# Patient Record
Sex: FEMALE | Race: WHITE | URBAN - METROPOLITAN AREA
[De-identification: names, ages, dates, MRNs, and addresses within clinical notes are randomized per-mention and may not be internally consistent; named-entity substitution may affect disease eponyms.]

---

## 2021-07-27 ENCOUNTER — OFFICE (OUTPATIENT)
Dept: URBAN - METROPOLITAN AREA CLINIC 67 | Facility: CLINIC | Age: 61
End: 2021-07-27

## 2021-07-27 VITALS
TEMPERATURE: 97.2 F | DIASTOLIC BLOOD PRESSURE: 70 MMHG | HEIGHT: 65 IN | SYSTOLIC BLOOD PRESSURE: 110 MMHG | WEIGHT: 111 LBS

## 2021-07-27 DIAGNOSIS — K62.5 RECTAL BLEEDING: ICD-10-CM

## 2021-07-27 DIAGNOSIS — Z86.010: ICD-10-CM

## 2021-07-27 DIAGNOSIS — K64.9 HEMORRHOIDS: ICD-10-CM

## 2021-07-27 DIAGNOSIS — Z80.0 FAMILY HISTORY OF MALIGNANT NEOPLASM OF COLON: ICD-10-CM

## 2021-07-27 PROCEDURE — 99242 OFF/OP CONSLTJ NEW/EST SF 20: CPT | Performed by: NURSE PRACTITIONER

## 2021-07-27 NOTE — SERVICEHPINOTES
This is a   60   year old  female   seen   in consultation at the request of Dr. GORDON PERAZA  . Pt w/ hx of adenomatous colon polyps (according to pt post-colonoscopy report from 2009, as well according to pt's recollection after her last colonoscopy in 2017 when she was told to repeat colonoscopy in 3-5 years) as well as w/ FHX of multiple cancers including 2 maternal aunts w/ CRC referred for evaluation prior to a repeat colonoscopy. BRPatient denies fever, nausea, vomiting, dysphagia, reflux, abdominal pain, change in bowel habits, constipation, diarrhea, melena, and significant change in weight. Denies shortness of breath and chest pain.   Currently asymptomatic but occasional rectal bleeding on TP poss from hemorrhoids as she feels "uncomfortable after long runs" and apparently was told she had hemorrhoids at the last colonoscopy.

## 2021-08-13 ENCOUNTER — AMBULATORY SURGICAL CENTER (OUTPATIENT)
Dept: URBAN - METROPOLITAN AREA SURGERY 42 | Facility: SURGERY | Age: 61
End: 2021-08-13

## 2021-08-13 VITALS
HEIGHT: 65 IN | OXYGEN SATURATION: 99 % | HEART RATE: 64 BPM | RESPIRATION RATE: 20 BRPM | WEIGHT: 111 LBS | TEMPERATURE: 97.9 F | DIASTOLIC BLOOD PRESSURE: 60 MMHG | SYSTOLIC BLOOD PRESSURE: 128 MMHG

## 2021-08-13 DIAGNOSIS — Z86.010 PERSONAL HISTORY OF COLONIC POLYPS: ICD-10-CM

## 2021-08-13 PROCEDURE — 45378 DIAGNOSTIC COLONOSCOPY: CPT | Performed by: INTERNAL MEDICINE

## 2021-08-13 NOTE — SERVICEHPINOTES
This is a 60 year old female seen in consultation at the request of Dr. GORDON PERAZA. Pt w/ hx of adenomatous colon polyps (according to pt post-colonoscopy report from 2009, as well according to pt's recollection after her last colonoscopy in 2017 when she was told to repeat colonoscopy in 3-5 years) as well as w/ FHX of multiple cancers including 2 maternal aunts w/ CRC referred for evaluation prior to a repeat colonoscopy. BRPatient denies fever, nausea, vomiting, dysphagia, reflux, abdominal pain, change in bowel habits, constipation, diarrhea, melena, and significant change in weight. Denies shortness of breath and chest pain. Currently asymptomatic but occasional rectal bleeding on TP poss from hemorrhoids as she feels "uncomfortable after long runs" and apparently was told she had hemorrhoids at the last colonoscopy.

## 2021-10-21 ENCOUNTER — NURSE ONLY (OUTPATIENT)
Dept: HEMATOLOGY/ONCOLOGY | Facility: HOSPITAL | Age: 61
End: 2021-10-21
Payer: COMMERCIAL

## 2021-10-21 ENCOUNTER — GENETICS ENCOUNTER (OUTPATIENT)
Dept: GENETICS | Facility: HOSPITAL | Age: 61
End: 2021-10-21
Payer: COMMERCIAL

## 2021-10-21 DIAGNOSIS — Z80.9 FAMILY HISTORY OF CANCER: Primary | ICD-10-CM

## 2021-10-21 DIAGNOSIS — Z84.81 FAMILY HISTORY OF BRCA2 GENE POSITIVE: ICD-10-CM

## 2021-10-21 PROCEDURE — 36415 COLL VENOUS BLD VENIPUNCTURE: CPT

## 2021-10-21 PROCEDURE — 96040 HC GENETIC COUNSELING EA 30 MIN: CPT

## 2021-10-21 NOTE — PROGRESS NOTES
Patient Name: Gustavo Whittaker  YOB: 1960  Date of Visit: 10/21/2021    Reason for visit:  Ms. Rubina Andersen was seen for the purposes of genetic counseling due to a family history of cancer and a known familial BRCA2 c.5645C>A ([id number]*) pathogeni ~1 year ago (no report). She has 4 daughters, 2 are carriers of cystic fibrosis.   - 1 sister (~52 with negative genetic testing) and 3 brothers (72 had negative genetic testing, 62 no genetic testing, 48 with positive genetic testing for the BRCA2 c.5645C> Ms. Nirmal Conde has 5 maternal aunts and 6 maternal uncles:  - 1st maternal aunt  ~1 y due to a congenital heart defect.   - 2nd maternal aunt  in her 80s with uterine cancer dx at 80, she had 7 children, 1 son with thyroid cancer and 1 daughter with t same BRCA2 variant as his father, genetic testing is indicated.      Information and Medical Management Recommendations for Individuals with a BRCA2 Pathogenic Variant  Individuals with a single pathogenic BRCA2 mutation have an increased risk to develop ce ovarian cancer in the family if applicable.   • Although of uncertain benefit, for those patients who have not elected risk-reducing salpingo-oophorectomy (surgical removal of the fallopian tubes and ovaries), consider concurrent transvaginal ultrasound wit physician. Additionally, individuals with a single pathogenic BRCA2 variant are also carriers of autosomal recessive Fanconi anemia.  Fanconi anemia is characterized by bone marrow failure with variable additional anomalies, which often include short s Individuals who are positive for the same mutation would be expected to have a much greater risk for developing hereditary cancer during their lifetime than the general population and surveillance and management would be rigorous.   For those individuals wh information could potentially be used to inform decisions concerning eligibility, premiums, and coverage. Ms. Michaela Riddle appeared to understand the information presented. On the day of the visit Ms. Michaela Riddle elected to proceed with genetic testing for the fa

## 2021-11-02 ENCOUNTER — GENETICS ENCOUNTER (OUTPATIENT)
Dept: HEMATOLOGY/ONCOLOGY | Facility: HOSPITAL | Age: 61
End: 2021-11-02

## 2021-11-02 DIAGNOSIS — Z13.71 BRCA2 GENE MUTATION NEGATIVE IN FEMALE: Primary | ICD-10-CM

## 2021-11-02 NOTE — PROGRESS NOTES
Patient Name: Brenna Uriostegui  YOB: 1960    Referring Provider:  Self/family     Reason for Referral:  Ms. Livan Zarco had genetic testing performed on 10/21/2021 because of a family history of cancer and a known familial BRCA2 c.5645C>A ([id number]